# Patient Record
Sex: FEMALE | Race: WHITE | NOT HISPANIC OR LATINO
[De-identification: names, ages, dates, MRNs, and addresses within clinical notes are randomized per-mention and may not be internally consistent; named-entity substitution may affect disease eponyms.]

---

## 2020-04-26 ENCOUNTER — MESSAGE (OUTPATIENT)
Age: 65
End: 2020-04-26

## 2020-05-02 LAB
SARS-COV-2 IGG SERPL IA-ACNC: 5.5 INDEX
SARS-COV-2 IGG SERPL QL IA: POSITIVE

## 2021-08-24 ENCOUNTER — NON-APPOINTMENT (OUTPATIENT)
Age: 66
End: 2021-08-24

## 2021-09-03 ENCOUNTER — APPOINTMENT (OUTPATIENT)
Dept: PLASTIC SURGERY | Facility: CLINIC | Age: 66
End: 2021-09-03
Payer: COMMERCIAL

## 2021-09-03 VITALS
WEIGHT: 194 LBS | HEART RATE: 73 BPM | SYSTOLIC BLOOD PRESSURE: 136 MMHG | OXYGEN SATURATION: 98 % | TEMPERATURE: 99 F | RESPIRATION RATE: 18 BRPM | DIASTOLIC BLOOD PRESSURE: 91 MMHG | HEIGHT: 62.4 IN | BODY MASS INDEX: 35.25 KG/M2

## 2021-09-03 DIAGNOSIS — Z85.43 PERSONAL HISTORY OF MALIGNANT NEOPLASM OF OVARY: ICD-10-CM

## 2021-09-03 DIAGNOSIS — N62 HYPERTROPHY OF BREAST: ICD-10-CM

## 2021-09-03 DIAGNOSIS — I48.0 PAROXYSMAL ATRIAL FIBRILLATION: ICD-10-CM

## 2021-09-03 PROBLEM — Z00.00 ENCOUNTER FOR PREVENTIVE HEALTH EXAMINATION: Status: ACTIVE | Noted: 2021-09-03

## 2021-09-03 PROCEDURE — 99204 OFFICE O/P NEW MOD 45 MIN: CPT

## 2021-09-03 RX ORDER — BIOTIN 10 MG
TABLET ORAL
Refills: 0 | Status: ACTIVE | COMMUNITY

## 2021-09-03 RX ORDER — APIXABAN 2.5 MG/1
2.5 TABLET, FILM COATED ORAL
Refills: 0 | Status: ACTIVE | COMMUNITY

## 2021-09-03 RX ORDER — MULTIVITAMIN
TABLET ORAL
Refills: 0 | Status: ACTIVE | COMMUNITY

## 2021-09-03 RX ORDER — UBIDECARENONE 200 MG
CAPSULE ORAL
Refills: 0 | Status: ACTIVE | COMMUNITY

## 2021-09-03 RX ORDER — CHROMIUM 200 MCG
TABLET ORAL
Refills: 0 | Status: ACTIVE | COMMUNITY

## 2021-09-03 NOTE — ASSESSMENT
[FreeTextEntry1] : Ms. BESSIE HORNER  is a suitable candidate for a pedicled breast reduction.  Planning inferior pedicle with anchor scar and reduction by 850 gm larger side r and 725 gm l smaller size . needs medical cardiac clearance and stop anticoagulation perioperatively

## 2021-09-03 NOTE — HISTORY OF PRESENT ILLNESS
[FreeTextEntry1] :  Ms. BESSIE HORNER  is a   66 year  old female complaining of large hypertrophic breasts, causing neck, back and shoulder strain.  She also complains of headache and fatigue from constantly trying to support her spine.  Her symptoms began shortly after puberty and remain a constant source  of discomfort and social embarrassment. Her symptoms are not relieved by postural changes weight reduction or supportive measures.  She has been under the care of other practitioners for relief which is ineffectual. The patient is an asymmetrical r>l DDD cup and desires reduction to D cup.   The risks, benefits, alternatives limitations and the permanent scars were outlined with the patient and she is prepared for a pedicled  breast reduction with anchor scar. I anticipate removal of 850 gm r larger side and 725 gm l smaller side The patient has history of ovarian cancer s/p hysterectomy and bilateral oophorectomy and has strong fh ovarian m and s braca neg . pt has h/o afib x 2 episodes and is on anticoagulant .  she is in sinus rhythm now .  Dr villalobos is cardiologist who will need to manage anticoagulation perioperatively. pt is overweight but does not desires to lose weight before the surgery and understands if loses after the breasts will reduce and sag

## 2021-09-03 NOTE — REVIEW OF SYSTEMS
[Shoulder Problem] : shoulder problems [Shoulder Pain] : shoulder pain [Mid Back Pain] : mid back pain [Upper Back Pain] : upper back pain [Negative] : Heme/Lymph [FreeTextEntry5] : afib hx

## 2021-09-03 NOTE — PHYSICAL EXAM
[NI] : Normal [de-identified] : sn n 366 r 34 l nl sens no mass scar r upper breast from chemo port  [de-identified] : obese  [de-identified] : bra grooves

## 2025-01-12 ENCOUNTER — NON-APPOINTMENT (OUTPATIENT)
Age: 70
End: 2025-01-12

## 2025-08-23 ENCOUNTER — TRANSCRIPTION ENCOUNTER (OUTPATIENT)
Age: 70
End: 2025-08-23